# Patient Record
(demographics unavailable — no encounter records)

---

## 2024-10-23 NOTE — PHYSICAL EXAM
[FreeTextEntry1] : A&OX3 steiner no edema abd soft ext anal exam-no masses imelda-increased tone w/ spasm.  AML tenderness anoscopy-procedure performed in standard fashion under direct vision with a small adult scope.  there were no complications -no proctitis -aml fissure

## 2024-10-23 NOTE — ASSESSMENT
[FreeTextEntry1] : Persistent rectal pain, anal fissure -Likely acute anal fissure which likely ocurrs intermittently given increased tone -pt to continue fiber supplement -will prescribe HC 2.5% as needed -We once again discussed botox injection w/ sphinter dilation.   -pt to see if there is any improvement and will call the office if she wishes to proceed. -all questions answered

## 2024-10-23 NOTE — HISTORY OF PRESENT ILLNESS
[FreeTextEntry1] : 85 yo female w/ pmh of proctalgia and anal fissure dx presents with recent worsening pain with BM and 2 episodes of bleeding.  no abd pain.  no change in bowel habits.  pt continues w/ fiber.  she attempted biofeedback with little improvement.  No additional aggravating factors

## 2024-11-08 NOTE — HISTORY OF PRESENT ILLNESS
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 Rodriguez GYN- Dr. Haynes PCP- Dr. Mcdowell GI- Dr. Brett Burch Pain- Dr. Hernandez Rheum- Dr. Corcoran Ortho- Dr. Fowler Neuro: Dr Nemesio Bhat ([facility]) [phone] Colorectal - Dr. Juan Antonio Donovan  Ms. Villasenor presents for follow up. She notes no VB, VD, or pelvic pain. She does report freq UTI and an anal fissue; req deferring the RV. ? Yeast inf she wonders.    Has seen MS for a LGT eval in 2023  HM BHM: She confirms this is directed by her PCP.  DEXA: Still no available report but she confirms she is on Prolia for OP.

## 2024-11-08 NOTE — PHYSICAL EXAM
[Chaperone Present] : A chaperone was present in the examining room during all aspects of the physical examination [Abnormal] : Examination of extremities for edema and/or varicosities: Abnormal [Absent] : Adnexa(ae): Absent [Fully active, able to carry on all pre-disease performance without restriction] : Status 0 - Fully active, able to carry on all pre-disease performance without restriction [26618] : A chaperone was present during the pelvic exam. [FreeTextEntry2] : Liliya [Normal] : no adenopathy noted in inguinal lymph nodes [de-identified] : 1+ edema [de-identified] : Scars intact [de-identified] : Uses a walker [de-identified] : atrophy; no discharge noted [de-identified] : RV deferred at pt request

## 2024-11-08 NOTE — PHYSICAL EXAM
[Chaperone Present] : A chaperone was present in the examining room during all aspects of the physical examination [Abnormal] : Examination of extremities for edema and/or varicosities: Abnormal [Absent] : Adnexa(ae): Absent [Fully active, able to carry on all pre-disease performance without restriction] : Status 0 - Fully active, able to carry on all pre-disease performance without restriction [31391] : A chaperone was present during the pelvic exam. [FreeTextEntry2] : Liliya [Normal] : no adenopathy noted in inguinal lymph nodes [de-identified] : 1+ edema [de-identified] : Scars intact [de-identified] : Uses a walker [de-identified] : atrophy; no discharge noted [de-identified] : RV deferred at pt request

## 2024-11-08 NOTE — HISTORY OF PRESENT ILLNESS
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 Rodriguez GYN- Dr. Haynes PCP- Dr. Mcdowell GI- Dr. Brett Burch Pain- Dr. Hernandez Rheum- Dr. Corcoran Ortho- Dr. Fowler Neuro: Dr Nemesio Bhat ([facility]) [phone] Colorectal - Dr. Juan Antonio Donovan  Ms. Villasenor presents for follow up. She notes no VB, VD, or pelvic pain. She does report freq UTI and an anal fissue; req deferring the RV. ? Yeast inf she wonders.    Has seen MS for a LGT eval in 2023  HM BHM: She confirms this is directed by her PCP.  DEXA: Still no available report but she confirms she is on Prolia for OP.

## 2024-11-08 NOTE — DISCUSSION/SUMMARY
[Reviewed Clinical Lab Test(s)] : Results of clinical tests were reviewed. [Reviewed Radiology Report(s)] : Radiology reports were reviewed. [FreeTextEntry1] : She remains clinically FILOMENA. -We disc her status and planned surveillance. We disc her reassuring exam.  -We disc he ?sx and my findings. We disc poss role of douching prn (plain water).  -We disc her BHM, and the PCP wanda.  -We have disc her bone health and no recent testing reports.  -She will f/u with CRS re further disc of piles/anal fissure.  -She saw MS re MARVINT wanda.  -Her instructions were reviewed.  -All Q/A.  -She'll RTO in 6m, sooner prn.  -Effort for the visit includes the note prep, review of prior material, interview, exam, further documentation, and coordination of care.

## 2025-05-17 NOTE — PHYSICAL EXAM
[Abnormal] : Examination of extremities for edema and/or varicosities: Abnormal [Absent] : Adnexa(ae): Absent [Normal] : Anus and perineum: Normal sphincter tone, no masses, no prolapse. [Fully active, able to carry on all pre-disease performance without restriction] : Status 0 - Fully active, able to carry on all pre-disease performance without restriction [MA] : MA [FreeTextEntry2] : Rochelle [de-identified] : 1-2+ edema [de-identified] : Scars intact [de-identified] : Uses a walker [de-identified] : No erythema, induration, or ulceration noted [de-identified] : atrophy; no discharge noted [de-identified] : RV deferred at pt request

## 2025-05-17 NOTE — REVIEW OF SYSTEMS
[Negative] : Musculoskeletal [FreeTextEntry4] : See HPI [de-identified] : See HPI [de-identified] : See HPI

## 2025-05-17 NOTE — HISTORY OF PRESENT ILLNESS
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 Rodriguez GYN- Dr. Haynes PCP- Dr. Mcdowell GI- Dr. Brett Burch Pain- Dr. Hernandez Rheum- Dr. Corcoran Ortho- Dr. Fowler Neuro: Dr Nemesio Bhat ([facility]) [phone] Colorectal - Dr. Juan Antonio Donovan  Ms. Villasenor presents for follow up. She notes no VB, VD, or pelvic pain. She reports continued issues with her anal fissure and vulvar irritation for which she uses Replens. Uses a rolling walker.  Has seen MS for a LGT eval in 2023  HM BHM: She has confirmed that this is directed by her PCP.  DEXA: Still no available report but she confirms she is on Prolia for OP.

## 2025-05-17 NOTE — HISTORY OF PRESENT ILLNESS
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 Rodriguez GYN- Dr. Haynes PCP- Dr. Mcdowell GI- Dr. Brett Burch Pain- Dr. Hernandez Rheum- Dr. Corcoran Ortho- Dr. Fowler Neuro: Dr Nemesio Bhat ([facility]) [phone] Colorectal - Dr. Juan Antonio Donovan  Ms. Villasenor presents for follow up. She notes no VB, VD, or pelvic pain. She reports continued issues with her anal fissure and vulvar irritation for which she uses Replens. Uses a rolling walker.  Has seen MS for a LGT eval in 2023  HM BHM: She has confirmed that this is directed by her PCP.  DEXA: Still no available report but she confirms she is on Prolia for OP.

## 2025-05-17 NOTE — DISCUSSION/SUMMARY
[Reviewed Clinical Lab Test(s)] : Results of clinical tests were reviewed. [Reviewed Radiology Report(s)] : Radiology reports were reviewed. [FreeTextEntry1] : She remains clinically FILOMENA. -We disc her status and planned surveillance. We disc her reassuring exam.  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 We reviewed the prior trt by MS. Disc today's exam. She may cont to use Replens. Also disc a consult with UroGyn (APRYL) and she's agreeable; APRYL tasked and we provided her contact information.   -We disc her BHM, and the PCP wanda.  -We have disc her bone health and no recent testing reports.  -She will f/u with CRS re further disc of piles/anal fissure.  -Her instructions were reviewed.  -All Q/A.  -She'll RTO in 6m, sooner prn.  -Effort for the visit includes the note prep, review of prior material, interview, exam, further documentation, and coordination of care.

## 2025-05-17 NOTE — PHYSICAL EXAM
[Abnormal] : Examination of extremities for edema and/or varicosities: Abnormal [Absent] : Adnexa(ae): Absent [Normal] : Anus and perineum: Normal sphincter tone, no masses, no prolapse. [Fully active, able to carry on all pre-disease performance without restriction] : Status 0 - Fully active, able to carry on all pre-disease performance without restriction [MA] : MA [FreeTextEntry2] : Rochelle [de-identified] : 1-2+ edema [de-identified] : Scars intact [de-identified] : Uses a walker [de-identified] : No erythema, induration, or ulceration noted [de-identified] : atrophy; no discharge noted [de-identified] : RV deferred at pt request

## 2025-05-17 NOTE — REVIEW OF SYSTEMS
[Negative] : Musculoskeletal [FreeTextEntry4] : See HPI [de-identified] : See HPI [de-identified] : See HPI